# Patient Record
Sex: FEMALE | Race: WHITE | NOT HISPANIC OR LATINO | Employment: UNEMPLOYED | URBAN - NONMETROPOLITAN AREA
[De-identification: names, ages, dates, MRNs, and addresses within clinical notes are randomized per-mention and may not be internally consistent; named-entity substitution may affect disease eponyms.]

---

## 2023-07-25 DIAGNOSIS — M79.672 LEFT FOOT PAIN: Primary | ICD-10-CM

## 2023-07-26 ENCOUNTER — OFFICE VISIT (OUTPATIENT)
Dept: ORTHOPEDIC SURGERY | Facility: CLINIC | Age: 53
End: 2023-07-26
Payer: COMMERCIAL

## 2023-07-26 ENCOUNTER — HOSPITAL ENCOUNTER (OUTPATIENT)
Dept: GENERAL RADIOLOGY | Facility: HOSPITAL | Age: 53
Discharge: HOME | End: 2023-07-26
Admitting: PHYSICIAN ASSISTANT
Payer: COMMERCIAL

## 2023-07-26 VITALS
WEIGHT: 153 LBS | SYSTOLIC BLOOD PRESSURE: 156 MMHG | HEART RATE: 74 BPM | DIASTOLIC BLOOD PRESSURE: 110 MMHG | HEIGHT: 66 IN | BODY MASS INDEX: 24.59 KG/M2

## 2023-07-26 DIAGNOSIS — S92.352A CLOSED FRACTURE OF BASE OF FIFTH METATARSAL BONE OF LEFT FOOT, INITIAL ENCOUNTER: Primary | ICD-10-CM

## 2023-07-26 PROCEDURE — 73630 X-RAY EXAM OF FOOT: CPT | Performed by: RADIOLOGY

## 2023-07-26 PROCEDURE — 73630 X-RAY EXAM OF FOOT: CPT

## 2023-07-26 RX ORDER — IBUPROFEN 800 MG/1
TABLET ORAL
COMMUNITY
Start: 2023-04-12

## 2023-07-26 RX ORDER — LORATADINE 10 MG/1
TABLET ORAL
COMMUNITY
Start: 2023-07-18

## 2023-07-26 RX ORDER — VENLAFAXINE HYDROCHLORIDE 75 MG/1
CAPSULE, EXTENDED RELEASE ORAL
COMMUNITY
Start: 2023-07-18

## 2023-07-26 RX ORDER — MONTELUKAST SODIUM 10 MG/1
TABLET ORAL
COMMUNITY
Start: 2023-07-18

## 2023-07-26 RX ORDER — ALBUTEROL SULFATE 1.25 MG/3ML
SOLUTION RESPIRATORY (INHALATION)
COMMUNITY
Start: 2023-04-12

## 2023-07-26 RX ORDER — METOCLOPRAMIDE 10 MG/1
TABLET ORAL
COMMUNITY
Start: 2023-04-12

## 2023-07-26 RX ORDER — ESTROGENS, CONJUGATED 0.45 MG/1
TABLET, FILM COATED ORAL
COMMUNITY
Start: 2023-04-12

## 2023-07-26 RX ORDER — GABAPENTIN 800 MG
TABLET ORAL
COMMUNITY
Start: 2023-07-18

## 2023-07-26 RX ORDER — ALBUTEROL SULFATE 90 UG/1
AEROSOL, METERED RESPIRATORY (INHALATION)
COMMUNITY
Start: 2023-07-18

## 2023-07-26 NOTE — PROGRESS NOTES
[facility] [place]          Patient: Rita RUSSELL  YOB: 1970  Date of Encounter: 07/26/2023  PCP: Ana Laura Navarrete APRN      Subjective     Chief Complaint   Patient presents with    Left Foot - Initial Evaluation, Pain           History of Present Illness:     Rita RUSSELL is a 53 y.o. female presents today result of left foot fracture initially suffered 7/21/2023 which patient was walking on steps and got her left foot twisted it when she felt 2 pops and complained of lateral midfoot pain and symptoms.  Since that time the patient has undergone continuation of pain and symptoms with difficulty upon attempted ambulation.  Patient declines any paresthesias.  She reports no other prior injuries other than previously described.  Patient reports dull throbbing aching sensation to the lateral aspect of the foot.        There is no problem list on file for this patient.    Past Medical History:   Diagnosis Date    Anxiety     COPD (chronic obstructive pulmonary disease)     Depression     PTSD (post-traumatic stress disorder)      Past Surgical History:   Procedure Laterality Date    CHOLECYSTECTOMY      DENTAL PROCEDURE      HYSTERECTOMY      TUBAL ABDOMINAL LIGATION       Social History     Occupational History    Not on file   Tobacco Use    Smoking status: Every Day     Packs/day: 0.75     Types: Cigarettes    Smokeless tobacco: Never    Tobacco comments:     Patient has been cutting back    Vaping Use    Vaping Use: Former   Substance and Sexual Activity    Alcohol use: Never    Drug use: Yes     Types: Marijuana    Sexual activity: Defer    Rita RUSSELL  reports that she has been smoking cigarettes. She has been smoking an average of .75 packs per day. She has never used smokeless tobacco.. I have educated her on the risk of diseases from using tobacco products such as cancer, COPD, and heart disease.     I advised her to quit and she is not willing to quit.    I spent 3   minutes counseling the patient.          Social History     Social History Narrative    Not on file     Family History   Problem Relation Age of Onset    Stroke Mother     Osteopenia Mother     Cancer Paternal Aunt     Cancer Paternal Uncle      Current Outpatient Medications   Medication Sig Dispense Refill    albuterol (ACCUNEB) 1.25 MG/3ML nebulizer solution inhale 3 milliliters (1.25 mg) via nebulizer by inhalation route 4 times per day  as needed      ibuprofen (ADVIL,MOTRIN) 800 MG tablet Take one tablet by mouth three times a day with food as needed for pain      loratadine (CLARITIN) 10 MG tablet TAKE ONE TABLET BY MOUTH ONCE DAILY FOR ALLERGIES      metoclopramide (REGLAN) 10 MG tablet TAKE ONE TABLET BY MOUTH TWO TIMES A DAY AS NEEDED FOR 15 DAYS      montelukast (SINGULAIR) 10 MG tablet TAKE ONE TABLET BY MOUTH IN THE EVENING AS DIRECTED      Neurontin 800 MG tablet take 1 tablet (800 mg) by oral route 3 times per day for 30 days      Premarin 0.45 MG tablet TAKE ONE TABLET BY MOUTH EVERY DAY FOR HORMONE THERAPY      venlafaxine XR (EFFEXOR-XR) 75 MG 24 hr capsule Take 1 capsule every day by oral route for 30 days.      Ventolin  (90 Base) MCG/ACT inhaler inhale 2 puffs by inhalation route Q6H as needed       No current facility-administered medications for this visit.     Allergies   Allergen Reactions    Penicillins Hives and Swelling            Review of Systems   Constitutional: Negative.   HENT: Negative.     Eyes: Negative.    Cardiovascular: Negative.    Respiratory: Negative.     Endocrine: Negative.    Hematologic/Lymphatic: Negative.    Skin: Negative.    Musculoskeletal:         Pertinent positives listed in HPI   Gastrointestinal: Negative.    Genitourinary: Negative.    Neurological: Negative.    Psychiatric/Behavioral: Negative.     Allergic/Immunologic: Negative.        Objective      Vitals:    07/26/23 1009   BP: (!) 156/110   Pulse: 74   Weight: 69.4 kg (153 lb)   Height: 167.6 cm  "(66\")      BMI is within normal parameters. No other follow-up for BMI required.      Physical Exam  Vitals and nursing note reviewed.   Constitutional:       General: She is not in acute distress.     Appearance: She is not ill-appearing.   HENT:      Head: Normocephalic and atraumatic.      Right Ear: External ear normal.      Left Ear: External ear normal.      Nose: Nose normal. No congestion or rhinorrhea.   Eyes:      Extraocular Movements: Extraocular movements intact.      Conjunctiva/sclera: Conjunctivae normal.      Pupils: Pupils are equal, round, and reactive to light.   Cardiovascular:      Rate and Rhythm: Normal rate.      Pulses: Normal pulses.   Pulmonary:      Effort: Pulmonary effort is normal. No respiratory distress.      Breath sounds: No stridor.   Abdominal:      General: There is no distension.   Musculoskeletal:      Cervical back: Normal range of motion.      Left foot: Decreased range of motion. Normal capillary refill. Swelling, prominent metatarsal heads, tenderness, bony tenderness and crepitus present. No deformity, bunion, Charcot foot, foot drop or laceration. Normal pulse.   Skin:     General: Skin is warm and dry.      Capillary Refill: Capillary refill takes less than 2 seconds.   Neurological:      General: No focal deficit present.      Mental Status: She is alert and oriented to person, place, and time.   Psychiatric:         Mood and Affect: Mood normal.         Behavior: Behavior normal.         Thought Content: Thought content normal.         Judgment: Judgment normal.               Radiology:      XR Foot 3+ View Left    Result Date: 7/26/2023    Nondisplaced fracture base of fifth metatarsal.  This report was finalized on 7/26/2023 10:22 AM by Dr. Roland Aceves MD.           Assessment/Plan        ICD-10-CM ICD-9-CM   1. Closed fracture of base of fifth metatarsal bone of left foot, initial encounter  S92.352A 825.25       53-year-old female with notable evidence of a  " to 5-day history fifth metatarsal base fracture.  The patient was immobilized  of an acute injury in which patient suffered a nondisplaced and was asked to follow 1 week for repeat radiographs and alignment check.  There is not appear to be any direct surgical indication as this is an unfavorable zone and limited minimal risk for fracture nonunion.  Patient is compliant with immobilization and will return back to 8/3/2023 for repeat radiographs and alignment check.              This document was signed by Viet Garrett PA-C August 9, 2023     CC: Ana Laura Navarrete APRN      Dictated Utilizing Dragon Dictation:   Please note that portions of this note were completed with a voice recognition program.   Part of this note may be an electronic transcription/translation of spoken language to printed text using the Dragon Dictation System.

## 2023-07-27 DIAGNOSIS — M79.672 LEFT FOOT PAIN: Primary | ICD-10-CM

## 2023-08-02 ENCOUNTER — OFFICE VISIT (OUTPATIENT)
Dept: ORTHOPEDIC SURGERY | Facility: CLINIC | Age: 53
End: 2023-08-02
Payer: COMMERCIAL

## 2023-08-02 ENCOUNTER — HOSPITAL ENCOUNTER (OUTPATIENT)
Dept: GENERAL RADIOLOGY | Facility: HOSPITAL | Age: 53
Discharge: HOME | End: 2023-08-02
Admitting: PHYSICIAN ASSISTANT
Payer: COMMERCIAL

## 2023-08-02 VITALS — BODY MASS INDEX: 24.59 KG/M2 | HEIGHT: 66 IN | WEIGHT: 153 LBS

## 2023-08-02 DIAGNOSIS — S92.355D CLOSED NONDISPLACED FRACTURE OF FIFTH METATARSAL BONE OF LEFT FOOT WITH ROUTINE HEALING, SUBSEQUENT ENCOUNTER: Primary | ICD-10-CM

## 2023-08-02 PROCEDURE — 73630 X-RAY EXAM OF FOOT: CPT

## 2023-08-02 PROCEDURE — 73630 X-RAY EXAM OF FOOT: CPT | Performed by: RADIOLOGY

## 2023-08-07 DIAGNOSIS — M79.672 LEFT FOOT PAIN: Primary | ICD-10-CM

## 2023-08-16 NOTE — PROGRESS NOTES
[facility] [place] [facility] Established Patient Visit          Patient: Rita RUSSELL  YOB: 1970  Date of Encounter: 8/2/2023  PCP: Ana Laura Navarrete APRN      Subjective     Chief Complaint   Patient presents with    Left Foot - Follow-up           History of Present Illness:     Rita RUSSELL is a 53 y.o. female presents today result of left foot fracture initially suffered 7/21/2023 which patient was walking on steps and got her left foot twisted it when she felt 2 pops and complained of lateral midfoot pain and symptoms.  Since that time the patient has undergone immobilization due to the nondisplaced fifth metatarsal base fracture.  Patient presents today for repeat radiographs and alignment check.  She reports that the ibuprofen has been making her nauseous and patient states that she had a mild increase in her overall pain as result of this.  She reports no other new complaints.  Patient has a long history of neuropathic pain.       There is no problem list on file for this patient.    Past Medical History:   Diagnosis Date    Anxiety     COPD (chronic obstructive pulmonary disease)     Depression     PTSD (post-traumatic stress disorder)      Past Surgical History:   Procedure Laterality Date    CHOLECYSTECTOMY      DENTAL PROCEDURE      HYSTERECTOMY      TUBAL ABDOMINAL LIGATION       Social History     Occupational History    Not on file   Tobacco Use    Smoking status: Every Day     Packs/day: 0.75     Types: Cigarettes    Smokeless tobacco: Never    Tobacco comments:     Patient has been cutting back    Vaping Use    Vaping Use: Former   Substance and Sexual Activity    Alcohol use: Never    Drug use: Yes     Types: Marijuana    Sexual activity: Defer    Rita RUSSELL  reports that she has been smoking cigarettes. She has been smoking an average of .75 packs per day. She has never used smokeless tobacco.. I have educated her on the risk of diseases from using tobacco products such as cancer, COPD,  and heart disease.     I advised her to quit and she is not willing to quit.    I spent 3  minutes counseling the patient.          Social History     Social History Narrative    Not on file     Family History   Problem Relation Age of Onset    Stroke Mother     Osteopenia Mother     Cancer Paternal Aunt     Cancer Paternal Uncle      Current Outpatient Medications   Medication Sig Dispense Refill    albuterol (ACCUNEB) 1.25 MG/3ML nebulizer solution inhale 3 milliliters (1.25 mg) via nebulizer by inhalation route 4 times per day  as needed      ibuprofen (ADVIL,MOTRIN) 800 MG tablet Take one tablet by mouth three times a day with food as needed for pain      loratadine (CLARITIN) 10 MG tablet TAKE ONE TABLET BY MOUTH ONCE DAILY FOR ALLERGIES      metoclopramide (REGLAN) 10 MG tablet TAKE ONE TABLET BY MOUTH TWO TIMES A DAY AS NEEDED FOR 15 DAYS      montelukast (SINGULAIR) 10 MG tablet TAKE ONE TABLET BY MOUTH IN THE EVENING AS DIRECTED      Neurontin 800 MG tablet take 1 tablet (800 mg) by oral route 3 times per day for 30 days      Premarin 0.45 MG tablet TAKE ONE TABLET BY MOUTH EVERY DAY FOR HORMONE THERAPY      venlafaxine XR (EFFEXOR-XR) 75 MG 24 hr capsule Take 1 capsule every day by oral route for 30 days.      Ventolin  (90 Base) MCG/ACT inhaler inhale 2 puffs by inhalation route Q6H as needed       No current facility-administered medications for this visit.     Allergies   Allergen Reactions    Penicillins Hives and Swelling            Review of Systems   Constitutional: Negative.   HENT: Negative.     Eyes: Negative.    Cardiovascular: Negative.    Respiratory: Negative.     Endocrine: Negative.    Hematologic/Lymphatic: Negative.    Skin: Negative.    Musculoskeletal:         Pertinent positives listed in HPI   Gastrointestinal: Negative.    Genitourinary: Negative.    Neurological: Negative.    Psychiatric/Behavioral: Negative.     Allergic/Immunologic: Negative.        Objective      Vitals:  "   08/02/23 1436   Weight: 69.4 kg (153 lb)   Height: 167.6 cm (65.98\")      BMI is within normal parameters. No other follow-up for BMI required.      Physical Exam  Vitals and nursing note reviewed.   Constitutional:       General: She is not in acute distress.     Appearance: She is not ill-appearing.   HENT:      Head: Normocephalic and atraumatic.      Right Ear: External ear normal.      Left Ear: External ear normal.      Nose: Nose normal. No congestion or rhinorrhea.   Eyes:      Extraocular Movements: Extraocular movements intact.      Conjunctiva/sclera: Conjunctivae normal.      Pupils: Pupils are equal, round, and reactive to light.   Cardiovascular:      Rate and Rhythm: Normal rate.      Pulses: Normal pulses.   Pulmonary:      Effort: Pulmonary effort is normal. No respiratory distress.      Breath sounds: No stridor.   Abdominal:      General: There is no distension.   Musculoskeletal:      Cervical back: Normal range of motion.      Left foot: Decreased range of motion. Normal capillary refill. Swelling, prominent metatarsal heads, tenderness, bony tenderness and crepitus present. No deformity, bunion, Charcot foot, foot drop or laceration. Normal pulse.   Skin:     General: Skin is warm and dry.      Capillary Refill: Capillary refill takes less than 2 seconds.   Neurological:      General: No focal deficit present.      Mental Status: She is alert and oriented to person, place, and time.   Psychiatric:         Mood and Affect: Mood normal.         Behavior: Behavior normal.         Thought Content: Thought content normal.         Judgment: Judgment normal.               Radiology:        XR Foot 3+ View Left    Result Date: 8/3/2023    Base of fifth metatarsal fracture nondisplaced again noted.  This report was finalized on 8/3/2023 8:32 AM by Dr. Vinny Herbert MD.      XR Foot 3+ View Left    Result Date: 7/26/2023    Nondisplaced fracture base of fifth metatarsal.  This report was finalized on " 7/26/2023 10:22 AM by Dr. Roland Aceves MD.           Assessment/Plan      No diagnosis found.      53-year-old female with notable evidence of a 2-week history fifth metatarsal zone 1 base fracture.  The patient has remained immobilized in which she reports mild dull throbbing aching pain.  Radiographs reveals no significant change in alignment or positioning of the nondisplaced base fracture of fifth metatarsal.  Patient was instructed to return back in 1 week for repeat right extremities and alignment check.            This document was signed by Viet Garrett PA-C August 16, 2023     CC: Ana Laura Navarrete APRN      Dictated Utilizing Dragon Dictation:   Please note that portions of this note were completed with a voice recognition program.   Part of this note may be an electronic transcription/translation of spoken language to printed text using the Dragon Dictation System.

## 2023-12-05 ENCOUNTER — TRANSCRIBE ORDERS (OUTPATIENT)
Dept: ADMINISTRATIVE | Facility: HOSPITAL | Age: 53
End: 2023-12-05
Payer: COMMERCIAL

## 2023-12-05 DIAGNOSIS — Z12.31 VISIT FOR SCREENING MAMMOGRAM: Primary | ICD-10-CM
